# Patient Record
Sex: FEMALE | Race: WHITE | Employment: OTHER | URBAN - METROPOLITAN AREA
[De-identification: names, ages, dates, MRNs, and addresses within clinical notes are randomized per-mention and may not be internally consistent; named-entity substitution may affect disease eponyms.]

---

## 2022-04-22 ENCOUNTER — HOSPITAL ENCOUNTER (OUTPATIENT)
Dept: PHYSICAL THERAPY | Age: 62
Discharge: HOME | End: 2022-04-22
Payer: COMMERCIAL

## 2022-04-22 PROCEDURE — 97161 PT EVAL LOW COMPLEX 20 MIN: CPT

## 2022-04-22 NOTE — PROGRESS NOTES
PT DAILY TREATMENT NOTE/KNEE EVAL     Patient Name: Samira Herring  Date:2022  : 1960  [x]  Patient  Verified  Payor: BLUE CROSS / Plan: Tarik Ba 5747 PPO / Product Type: PPO /    In TXGH:[id number]  Out time:0940  Total Treatment Time (min): 44  Visit #: 1 of 10    Medicare/BCBS Only   Total Timed Codes (min):  0 1:1 Treatment Time:  44       Treatment Area: Pain in left knee [M25.562]      SUBJECTIVE  Pain Level (0-10 scale): 2  [x]constant []intermittent []improving []worsening []no change since onset     Any medication changes, allergies to medications, adverse drug reactions, diagnosis change, or new procedure performed?: [x] No    [] Yes (see summary sheet for update)  Subjective functional status/changes:       Subjective: Patient is a 65 y.o. female referred to PT with the above Dx. Patient seen today for c/o left knee pain with reports of a heavy feeling. Pain is worse with sitting during work and walking stairs. Pain with sitting with the need to stretch her knee out for comfort. Onset prior to 2021 after taking a part time job with a lot of stand, walking and lifting. Elevates left leg at night to help with swelling and increased heaviness. Swelling in (B) Knees. Always a sharp pain at the pes anserine region. About 3 weeks ago, she got out of her car and was not able to put weight into the left knee. Received a cortisone shot which helped with the pain. Patient presents to PT with an impaired gait, decreased balance, decreased strength, decreased flexibility, and decreased mobility of (B) Knee and hip. She has a (+) (B) Slump Test and (+) Piriformis Test  Patient s/s appear to be consistent w/ diagnosis. Patient demonstrates the potential to make functional gains within a reasonable time frame. Patient will benefit from skilled PT to address impairments and improve functional mobility, strength, gait and balance for an improved quality of life.   Fall Risk Assessment: Patient demonstrates no Fall Risk      Mechanism of Injury: Unsure or reason    Comorbidities: OA, Alcohol Use, HTN, Tobacco Use, Erythema nodosum \"is a type of skin inflammation that is located in a part of the fatty layer of skin. Erythema nodosum results in reddish, painful, tender lumps most commonly located in the front of the legs below the knees. The tender lumps, or nodules, of erythema nodosum range in size from a dime to a quarter. Nov 15, 2021\"    Prior Level of Function: Regular knee pain but not as bas as it currently is    FOTO: 48 / 59 in 12 visits    Goal: No Pain    Health Status: Fair    What type of work do you do?     Living Situation/Domestic Life: Lives at home alone  Mobility: (I)  Self Care (I)  Stairs in the home? Stairs at children's home    What type of daily activities/hobbies? Grand children play and activities    Limitations to Activity/Recreation/PLOF: Can't squat, walk up stairs, lift or carry         Barriers: [x]pain []financial []time []transportation []other    Motivation: High    FABQ Score: []low []elevate    Cognition: A & O x 3    Other:    Risk For Falls:   []No  []low []elevate           OBJECTIVE/EXAMINATION  Demonstrated Balance: Good with standing and ambulation       Demonstrated Mobility: WNL  Gait: Slow reciprocal with decreased (B) pelvic sway and decreased (I) Hip Flx  - \"C\" shaped curve at low Tx/Lx region  - Elev right crest with good (B) innominate mobility  - Left patella lateral tracking  - (+) (B) York Test  - Decreased left VMO initiation  - Fear of stair negotiation with walking down stairs  - (+) (B) Slump Test  - (+) (B) Piriformis test         AROM PROM Strength Pain? ?    Right Left Right Left Right Left    Hip Flx     4 3+    Knee Flx     5 5    Knee Ext     5- 5              34 min [x]Eval                  []Re-Eval         10  NC min Therapeutic Exercise:  [x] See flow sheet : Develop and instruct HEP   Rationale: increase ROM, increase strength, and improve coordination to improve the patients ability to Initiate HEP and improve daily function                                                                  With   [] TE   [] TA   [] neuro   [] other: Patient Education: [x] Review HEP    [] Progressed/Changed HEP based on:   [] positioning   [] body mechanics   [] transfers   [] heat/ice application    [] other:       Other Objective/Functional Measures:      Access Code: [email or web address]  URL: [email or web address]  Date: 04/22/2022  Prepared by: Candida Love    Exercises  Long Sitting Quad Set - 2 x daily - 7 x weekly - 10 reps - 5\" hold  Supine Knee Extension Strengthening - 2 x daily - 7 x weekly - 3 sets - 10 reps  Supine Straight Leg Raises - 2 x daily - 7 x weekly - 3 sets - 10 reps  Seated March - 2 x daily - 7 x weekly - 3 sets - 10 reps  Step Up - 2 x daily - 7 x weekly - 3 sets - 10 reps  Sit to Stand - 2 x daily - 7 x weekly - 3 sets - 10 reps  Mini Squat with Counter Support - 2 x daily - 7 x weekly - 3 sets - 10 reps  Standing Knee Flexion - 2 x daily - 7 x weekly - 3 sets - 10 reps  Seated Piriformis Stretch - 1 x daily - 7 x weekly - 3 sets - 10 reps  Sciatic Nerve Glide - 1 x daily - 7 x weekly - 3 sets - 20 reps    Pain Level (0-10 scale) post treatment: 2     ASSESSMENT/Changes in Function:        [x]  See Plan of Care  []  See progress note/recertification  []  See Discharge Summary         Progress towards goals / Updated goals:  Short Term Goals: To be accomplished in 5 treatments:  1. Pt will be compliant and independent with HEP in order to facilitate PT sessions and aid with self management   Eval Status:  Initiated   Current Status:  2. Pt to tolerate 30 min or more of TE and/or Interventions w/o increased s/s   Eval Status:  Initiated   Current Status:    Long Term Goals: To be accomplished in 10 treatments:  1.   Pt will report 50% improvement or better with left knee dysfunction to show a significant increase in ability to tolerate ADL   Eval Status:  Initiated   Current Status:  2. Pt will demonstrate stair negotiaton of 24 6\" steps with no HHA with a good pace and little to no added pain for carryover to walking stairs at her children's house while     Eval Status:  Initiated   Current Status:  3. Pt will demonstrate a negative Left Slump Test to show decreased neural tension for limited discomfort and deviations with activity     Eval Status:  Initiated   Current Status:  4. Pt will ambulate on TM for 1/2 mile with little discomfort for progress to increased community ambulation with little to no difficulty     Eval Status:  Extreme difficulty walking a mile   Current Status:  5.   Pt will improve FOTO score to 59 in 12 visits to show significant improvement in function for progress to usual community ambulation   Eval Status: FOTO = 48   Current Status:      PLAN  [x]  Upgrade activities as tolerated     [x]  Continue plan of care  []  Update interventions per flow sheet       []  Discharge due to:_  []  Other:_      Jumana Maier PT 4/22/2022  8:48 AM

## 2022-04-26 ENCOUNTER — HOSPITAL ENCOUNTER (OUTPATIENT)
Dept: PHYSICAL THERAPY | Age: 62
Discharge: HOME | End: 2022-04-26
Payer: COMMERCIAL

## 2022-04-26 PROCEDURE — 97112 NEUROMUSCULAR REEDUCATION: CPT

## 2022-04-26 PROCEDURE — 97110 THERAPEUTIC EXERCISES: CPT

## 2022-04-26 PROCEDURE — 97035 APP MDLTY 1+ULTRASOUND EA 15: CPT

## 2022-04-29 ENCOUNTER — APPOINTMENT (OUTPATIENT)
Dept: PHYSICAL THERAPY | Age: 62
End: 2022-04-29
Payer: COMMERCIAL

## 2022-05-04 ENCOUNTER — HOSPITAL ENCOUNTER (OUTPATIENT)
Dept: PHYSICAL THERAPY | Age: 62
Discharge: HOME | End: 2022-05-04
Payer: COMMERCIAL

## 2022-05-04 PROCEDURE — 97110 THERAPEUTIC EXERCISES: CPT

## 2022-05-04 PROCEDURE — 97112 NEUROMUSCULAR REEDUCATION: CPT

## 2022-05-04 PROCEDURE — 97035 APP MDLTY 1+ULTRASOUND EA 15: CPT

## 2022-05-04 NOTE — PROGRESS NOTES
PT DAILY TREATMENT NOTE     Patient Name: Nataliya Cardona  Date:2022  : 1960  [x]  Patient  Verified  Payor: BLUE ABISAI / Plan: Tarik Ba 5747 PPO / Product Type: PPO /    In time:247  Out time:334  Total Treatment Time (min): 52  Visit #: 3 of 10    Medicare/BCBS Only   Total Timed Codes (min):  47 1:1 Treatment Time:  47       Treatment Area: Pain in left knee [M25.562]    SUBJECTIVE  Pain Level (0-10 scale): 3-4/10  Any medication changes, allergies to medications, adverse drug reactions, diagnosis change, or new procedure performed?: [x] No    [] Yes (see summary sheet for update)  Subjective functional status/changes:   [] No changes reported  Reports slightly increased pain in the knee today     OBJECTIVE      Modality rationale: decrease inflammation, decrease pain and increase tissue extensibility to improve the patients ability to tolerate increased activity   Min Type Additional Details      []? Estim:  []? Unatt       []? IFC  []? Premod                        []?Other:  []?w/ice   []?w/heat  Position:  Location:      []? Estim: []? Att    []? TENS instruct  []? NMES                    []?Other:  []?w/US   []?w/ice   []?w/heat  Position:  Location:      []? Traction: []? Cervical       []? Lumbar                       []? Prone          []? Supine                       []?Intermittent   []? Continuous Lbs:  []? before manual  []? after manual    8 [x]? Ultrasound: [x]? Continuous   []? Pulsed                           []? 1MHz   [x]? 3MHz W/cm2: 1.3  Location: Left Knee      []? Iontophoresis with dexamethasone         Location: []? Take home patch   []? In clinic      []? Ice     []?  heat  []? Ice massage  []? Laser   []? Anodyne Position:  Location:      []? Laser with stim  []? Other:  Position:  Location:      []? Vasopneumatic Device    []? Right     []?   Left  Pre-treatment girth:  Post-treatment girth:  Measured at (location):  Pressure:       []? lo []? med []? hi Temperature: []? lo []? med []? hi    [x]? Skin assessment post-treatment:  [x]? intact []? redness- no adverse reaction    []? redness  adverse reaction:     29 min Therapeutic Exercise:  [x] See flow sheet :   Rationale: increase ROM and increase strength to improve the patients ability to perform ADLs     10 min Neuromuscular Re-education:  [x]  See flow sheet :   Rationale: increase ROM and increase strength  to improve the patients ability to perform ADLs      With   [x] TE   [] TA   [] neuro   [] other: Patient Education: [x] Review HEP    [] Progressed/Changed HEP based on:   [] positioning   [] body mechanics   [] transfers   [] heat/ice application    [] other:      Other Objective/Functional Measures:   - Increased 3 way glute to 2x10   - Initiated 2-way clam and SL hip abduction 1x10     Pain Level (0-10 scale) post treatment: 0/10    ASSESSMENT/Changes in Function: Patient actively participates in therapy and tolerates progressions well with no c/o increased pain       Patient will continue to benefit from skilled PT services to modify and progress therapeutic interventions, address functional mobility deficits, address ROM deficits, address strength deficits, analyze and address soft tissue restrictions and analyze and cue movement patterns to attain remaining goals.      []  See Plan of Care  []  See progress note/recertification  []  See Discharge Summary         Progress towards goals / Updated goals:  Short Term Goals: To be accomplished in 5 treatments:  1.  Pt will be compliant and independent with HEP in order to facilitate PT sessions and aid with self management             Eval Status:  Initiated             Current Status: Pt reports some compliance with HEP but not twice a day 4/26/22  2.  Pt to tolerate 30 min or more of TE and/or Interventions w/o increased s/s             Eval Status:  Initiated             Current Status: Progressing at 56 min of treatment with decreased s/s 4/26/22     Long Term Goals: To be accomplished in 10 treatments:  1.  Pt will report 50% improvement or better with left knee dysfunction to show a significant increase in ability to tolerate ADL             Eval Status:  Initiated             Current Status:  2.  Pt will demonstrate stair negotiaton of 24 6\" steps with no HHA with a good pace and little to no added pain for carryover to walking stairs at her children's house while               Eval Status:  Initiated             Current Status:  3.  Pt will demonstrate a negative Left Slump Test to show decreased neural tension for limited discomfort and deviations with activity               Eval Status:  Initiated             Current Status:  4.  Pt will ambulate on  for 1/2 mile with little discomfort for progress to increased community ambulation with little to no difficulty               Eval Status:  Extreme difficulty walking a mile             Current Status:  5.  Pt will improve FOTO score to 59 in 12 visits to show significant improvement in function for progress to usual community ambulation             Eval Status: FOTO = 48             Current Status:     PLAN  [x]  Upgrade activities as tolerated     [x]  Continue plan of care  []  Update interventions per flow sheet       []  Discharge due to:_  []  Other:_      Maurice Sanchez, PTA 5/4/2022  2:58 PM    Future Appointments   Date Time Provider Leena Lin   5/5/2022  9:30 AM Marquetta Nissen, PTA [facility]   5/6/2022  9:30 AM Otila Running, PT [facility]   5/10/2022  2:45 PM Otila Running, PT [facility]   5/13/2022  9:30 AM Patrice Vo, PTA [facility]   5/17/2022 11:00 AM Ángela Shannon, PT [facility]   5/20/2022 10:15 AM Patrice Vo, PTA [facility]   5/24/2022  9:30 AM Patrice Vo PTA [facility]   5/27/2022  9:30 AM Patrice Vo PTA [facility]

## 2022-05-05 ENCOUNTER — APPOINTMENT (OUTPATIENT)
Dept: PHYSICAL THERAPY | Age: 62
End: 2022-05-05
Payer: COMMERCIAL

## 2022-05-06 ENCOUNTER — HOSPITAL ENCOUNTER (OUTPATIENT)
Dept: PHYSICAL THERAPY | Age: 62
Discharge: HOME | End: 2022-05-06
Payer: COMMERCIAL

## 2022-05-06 PROCEDURE — 97112 NEUROMUSCULAR REEDUCATION: CPT

## 2022-05-06 PROCEDURE — 97110 THERAPEUTIC EXERCISES: CPT

## 2022-05-06 NOTE — PROGRESS NOTES
PT DAILY TREATMENT NOTE     Patient Name: Francisco Maya  Date:2022  : 1960  [x]  Patient  Verified  Payor: BLUE CROSS / Plan: [facility] PPO / Product Type: PPO /    In time:930 Out time:  Total Treatment Time (min): 45  Visit #: 4 of 10    Medicare/BCBS Only   Total Timed Codes (min):  45 1:1 Treatment Time:  45       Treatment Area: Pain in left knee [M25.562]    SUBJECTIVE  Pain Level (0-10 scale): 3  Any medication changes, allergies to medications, adverse drug reactions, diagnosis change, or new procedure performed?: [x] No    [] Yes (see summary sheet for update)  Subjective functional status/changes:   [] No changes reported  Continues to have increased pain in the morning at the back of the left knee    OBJECTIVE    Modality rationale: decrease inflammation, decrease pain and increase tissue extensibility to improve the patients ability to tolerate increased activity   Min Type Additional Details    [] Estim:  []Unatt       []IFC  []Premod                        []Other:  []w/ice   []w/heat  Position:  Location:    [] Estim: []Att    []TENS instruct  []NMES                    []Other:  []w/US   []w/ice   []w/heat  Position:  Location:    []  Traction: [] Cervical       []Lumbar                       [] Prone          []Supine                       []Intermittent   []Continuous Lbs:  [] before manual  [] after manual   8 [x]  Ultrasound: [x]Continuous   [] Pulsed                           []1MHz   [x]3MHz W/cm2: 1.3  Location: Left Knee front and back    []  Iontophoresis with dexamethasone         Location: [] Take home patch   [] In clinic    []  Ice     []  heat  []  Ice massage  []  Laser   []  Anodyne Position:  Location:    []  Laser with stim  []  Other:  Position:  Location:    []  Vasopneumatic Device    []  Right     []  Left  Pre-treatment girth:  Post-treatment girth:  Measured at (location):  Pressure:       [] lo [] med [] hi   Temperature: [] lo [] med [] hi   [x] Skin assessment post-treatment:  [x]intact []redness- no adverse reaction    []redness  adverse reaction:     25 min Therapeutic Exercise:  [x] See flow sheet :   Rationale: increase ROM, increase strength and improve coordination to improve the patients ability to tolerate increased activity    12 min Neuromuscular Re-education:  []  See flow sheet :   Rationale: increase strength and improve coordination  to improve the patients ability to help improve daily function             With   [x] TE   [] TA   [] neuro   [] other: Patient Education: [x] Review HEP    [] Progressed/Changed HEP based on:   [] positioning   [] body mechanics   [] transfers   [] heat/ice application    [] other:      Other Objective/Functional Measures:   - Minor VCs with exercise participation  - - Required VCs to increase Fwd lean with Sciatic NG  - C/O left knee pain with stabilization during standing hip strength training    Pain Level (0-10 scale) post treatment: 0    ASSESSMENT/Changes in Function:   - Pt demo good effort and participation with treatment program with good tolerance and decreased symptoms after treatment      Patient will continue to benefit from skilled PT services to modify and progress therapeutic interventions, address functional mobility deficits, address ROM deficits, address strength deficits, analyze and address soft tissue restrictions and analyze and cue movement patterns to attain remaining goals. []  See Plan of Care  []  See progress note/recertification  []  See Discharge Summary         Progress towards goals / Updated goals:  Short Term Goals: To be accomplished in 5 treatments:  1. Pt will be compliant and independent with HEP in order to facilitate PT sessions and aid with self management             Eval Status:  Initiated             Current Status: Pt reports some compliance with HEP but not twice a day 4/26/22  2.   Pt to tolerate 30 min or more of TE and/or Interventions w/o increased s/s             Eval Status:  Initiated             Current Status: Progressing at 56 min of treatment with decreased s/s 4/26/22     Long Term Goals: To be accomplished in 10 treatments:  1. Pt will report 50% improvement or better with left knee dysfunction to show a significant increase in ability to tolerate ADL             Eval Status:  Initiated             Current Status:  2. Pt will demonstrate stair negotiaton of 24 6\" steps with no HHA with a good pace and little to no added pain for carryover to walking stairs at her children's house while               Eval Status:  Initiated             Current Status: Progressing at 16 steps HHA with a reciprocal gait and good pace 5/6/22  3. Pt will demonstrate a negative Left Slump Test to show decreased neural tension for limited discomfort and deviations with activity               Eval Status:  Initiated             Current Status: Pt showing decreased neural tension with improved HS flexibility during stretches and Sciatic NG 5/6/22  4. Pt will ambulate on TM for 1/2 mile with little discomfort for progress to increased community ambulation with little to no difficulty               Eval Status:  Extreme difficulty walking a mile             Current Status: Progressing at 200' 5/6/22  5.   Pt will improve FOTO score to 59 in 12 visits to show significant improvement in function for progress to usual community ambulation             Eval Status: FOTO = 48             Current Status:      PLAN  [x]  Upgrade activities as tolerated     [x]  Continue plan of care  []  Update interventions per flow sheet       []  Discharge due to:_  []  Other:_      Bren Dorantes, PT 5/6/2022  8:28 AM    Future Appointments   Date Time Provider Leena Lin   5/6/2022  9:30 AM Kwame New PT [facility]   5/10/2022  2:45 PM Kwame New, PT [facility]   5/13/2022  9:30 AM Lula Ribeiro PTA [facility]   5/17/2022 11:00 AM Lalo Apple, PT [facility]   5/20/2022 10:15 AM Christina Shipman PTA [facility]   5/24/2022  9:30 AM Christina Shipman PTA [facility]   5/27/2022  9:30 AM Christina Shipman PTA [facility]

## 2022-05-10 ENCOUNTER — APPOINTMENT (OUTPATIENT)
Dept: PHYSICAL THERAPY | Age: 62
End: 2022-05-10
Payer: COMMERCIAL

## 2022-05-13 ENCOUNTER — APPOINTMENT (OUTPATIENT)
Dept: PHYSICAL THERAPY | Age: 62
End: 2022-05-13
Payer: COMMERCIAL

## 2022-05-17 ENCOUNTER — APPOINTMENT (OUTPATIENT)
Dept: PHYSICAL THERAPY | Age: 62
End: 2022-05-17
Payer: COMMERCIAL

## 2022-05-20 ENCOUNTER — APPOINTMENT (OUTPATIENT)
Dept: PHYSICAL THERAPY | Age: 62
End: 2022-05-20
Payer: COMMERCIAL

## 2022-05-24 ENCOUNTER — APPOINTMENT (OUTPATIENT)
Dept: PHYSICAL THERAPY | Age: 62
End: 2022-05-24
Payer: COMMERCIAL

## 2022-05-27 ENCOUNTER — APPOINTMENT (OUTPATIENT)
Dept: PHYSICAL THERAPY | Age: 62
End: 2022-05-27
Payer: COMMERCIAL

## 2022-05-31 ENCOUNTER — HOSPITAL ENCOUNTER (OUTPATIENT)
Dept: PHYSICAL THERAPY | Age: 62
Discharge: HOME | End: 2022-05-31
Payer: COMMERCIAL

## 2022-05-31 PROCEDURE — 97035 APP MDLTY 1+ULTRASOUND EA 15: CPT

## 2022-05-31 PROCEDURE — 97140 MANUAL THERAPY 1/> REGIONS: CPT

## 2022-05-31 NOTE — PROGRESS NOTES
PT DAILY TREATMENT NOTE     Patient Name: Hina Chamberlain  Date:2022  : 1960  [x]  Patient  Verified  Payor: BLUE CROSS / Plan: [id number] PPO / Product Type: PPO /    In time:330  Out time:400  Total Treatment Time (min): 30  Visit #: 5 of 10    Medicare/BCBS Only   Total Timed Codes (min):  30 1:1 Treatment Time:  30       Treatment Area: Pain in left knee [M25.562]    SUBJECTIVE  Pain Level (0-10 scale): 2/10  Any medication changes, allergies to medications, adverse drug reactions, diagnosis change, or new procedure performed?: [x] No    [] Yes (see summary sheet for update)  Subjective functional status/changes:   [] No changes reported  Patient reports 2/10 pain with a feeling of \"heaviness\"     OBJECTIVE    Modality rationale: decrease inflammation, decrease pain and increase tissue extensibility to improve the patients ability to perform ADLs   Min Type Additional Details    [] Estim:  []Unatt       []IFC  []Premod                        []Other:  []w/ice   []w/heat  Position:  Location:    [] Estim: []Att    []TENS instruct  []NMES                    []Other:  []w/US   []w/ice   []w/heat  Position:  Location:    []  Traction: [] Cervical       []Lumbar                       [] Prone          []Supine                       []Intermittent   []Continuous Lbs:  [] before manual  [] after manual   12 [x]  Ultrasound: [x]Continuous   [] Pulsed                           []1MHz   [x]3MHz W/cm2:1.3  Location: right knee     []  Iontophoresis with dexamethasone         Location: [] Take home patch   [] In clinic    []  Ice     []  heat  []  Ice massage  []  Laser   []  Anodyne Position:  Location:    []  Laser with stim  []  Other:  Position:  Location:    []  Vasopneumatic Device    []  Right     []  Left  Pre-treatment girth:  Post-treatment girth:  Measured at (location):  Pressure:       [] lo [] med [] hi   Temperature: [] lo [] med [] hi   [x] Skin assessment post-treatment: [x]intact []redness- no adverse reaction    []redness  adverse reaction:       18 min Manual Therapy:  STM/DTM/effleurage left gastroc/knee    The manual therapy interventions were performed at a separate and distinct time from the therapeutic activities interventions. Rationale: decrease pain, increase ROM, increase tissue extensibility and decrease edema  to perform ADLs            With   [x] TE   [] TA   [] neuro   [] other: Patient Education: [x] Review HEP    [] Progressed/Changed HEP based on:   [] positioning   [] body mechanics   [] transfers   [] heat/ice application    [] other:      Other Objective/Functional Measures:   - Patient declined any TE     Pain Level (0-10 scale) post treatment: 2/10    ASSESSMENT/Changes in Function: Patient continuously declines any TE. States \"I'm only here for the ultrasound. \" patient declines to make any further appointments until Pierson returns. Patient will continue to benefit from skilled PT services to modify and progress therapeutic interventions, address functional mobility deficits, address ROM deficits, address strength deficits, analyze and address soft tissue restrictions and analyze and cue movement patterns to attain remaining goals.      []  See Plan of Care  []  See progress note/recertification  []  See Discharge Summary         Progress towards goals / Updated goals:  Short Term Goals: To be accomplished in 5 treatments:  1.  Pt will be compliant and independent with HEP in order to facilitate PT sessions and aid with self management             Eval Status:  Initiated             Current Status: Pt reports some compliance with HEP but not twice a day 4/26/22  2.  Pt to tolerate 30 min or more of TE and/or Interventions w/o increased s/s             Eval Status:  Initiated             Current Status: Progressing at 56 min of treatment with decreased s/s 4/26/22     Long Term Goals: To be accomplished in 10 treatments:  1.  Pt will report 50% improvement or better with left knee dysfunction to show a significant increase in ability to tolerate ADL             Eval Status:  Initiated             Current Status:  2.  Pt will demonstrate stair negotiaton of 24 6\" steps with no HHA with a good pace and little to no added pain for carryover to walking stairs at her children's house while               Eval Status:  Initiated             Current Status: Progressing at 16 steps HHA with a reciprocal gait and good pace 5/6/22  3.  Pt will demonstrate a negative Left Slump Test to show decreased neural tension for limited discomfort and deviations with activity               Eval Status:  Initiated             Current Status: Pt showing decreased neural tension with improved HS flexibility during stretches and Sciatic NG 5/6/22  4.  Pt will ambulate on TM for 1/2 mile with little discomfort for progress to increased community ambulation with little to no difficulty               Eval Status:  Extreme difficulty walking a mile             Current Status: Progressing at 200' 5/6/22  5.  Pt will improve FOTO score to 59 in 12 visits to show significant improvement in function for progress to usual community ambulation             Eval Status: FOTO = 48             Current Status:      PLAN  [x]  Upgrade activities as tolerated     [x]  Continue plan of care  []  Update interventions per flow sheet       []  Discharge due to:_  []  Other:_      William Mc, PTA 5/31/2022  4:01 PM    No future appointments.

## 2022-06-01 NOTE — PROGRESS NOTES
[facility] at [address] [facility]  [address]  Phone: [phone]      Fax:  [phone]    Progress Note  Therapy Services  Patient name: Suyapa Harden Start of Care: 2022   Referral source: Yuridia Hernandez : 1960               Medical Diagnosis: Pain in left knee [M25.562]  Payor: BLUE [place] / Plan: [facility] PPO / Product Type: PPO /  Onset Date:Prior to last Evaristo with continued exacerbations as of 3/30/22               Treatment Diagnosis: Left Knee Pain   Prior Hospitalization: see medical history Provider#: [id number]   Medications: Verified on Patient summary List      Comorbidities: OA, Alcohol Use, HTN, Tobacco Use, Erythema nodosum \"is a type of skin inflammation that is located in a part of the fatty layer of skin. Erythema nodosum results in reddish, painful, tender lumps most commonly located in the front of the legs below the knees. The tender lumps, or nodules, of erythema nodosum range in size from a dime to a quarter. Nov 15, 2021\"  Prior Level of Function: Regular knee pain but not as bas as it currently is                  Visits from Start of Care: 5   Missed Visits: 8    Established Goals:            [address] be accomplished in 5 treatments:  1.  Pt will be compliant and independent with HEP in order to facilitate PT sessions and aid with self management             Eval Status:  Initiated             Current Status: Pt reports some compliance with HEP but not twice a day 22  2.  Pt to tolerate 30 min or more of TE and/or Interventions w/o increased s/s             Eval Status:  Initiated             Current Status: met at 56 min of treatment with decreased s/s      Long Term Goals: To be accomplished in 10 treatments:  1.  Pt will report 50% improvement or better with left knee dysfunction to show a significant increase in ability to tolerate ADL             Eval Status:  Initiated             Current Status:  2.  Pt will demonstrate stair negotiaton of 24 6\" steps with no HHA with a good pace and little to no added pain for carryover to walking stairs at her children's house while               Eval Status:  Initiated             Current Status: Progressing at 16 steps HHA with a reciprocal gait and good pace   3.  Pt will demonstrate a negative Left Slump Test to show decreased neural tension for limited discomfort and deviations with activity               Eval Status:  Initiated             Current Status: Pt showing decreased neural tension with improved HS flexibility during stretches and Sciatic NG   4.  Pt will ambulate on TM for 1/2 mile with little discomfort for progress to increased community ambulation with little to no difficulty               Eval Status:  Extreme difficulty walking a mile             Current Status: Progressing at 200'   5.  Pt will improve FOTO score to 59 in 12 visits to show significant improvement in function for progress to usual community ambulation             Eval Status: FOTO = 48             Current Status: Not assessed     Key Functional Changes: Patient has shown little to no progress with this treatment program d/t non-compliance with treatment program and appointment cancellations. Pain as of last visit was 2/10. Patient declined to participate in any TE. Patient was educated on the purpose of TE with physical therapy. Patient continues to decline and requests ultrasound.      Updated Goals: to be achieved in 10 treatments:   Short Term Goals: To be accomplished in 5 treatments:  1.  Pt will be compliant and independent with HEP in order to facilitate PT sessions and aid with self management             Eval Status: Pt reports some compliance with HEP but not twice a day              Current Status:   Long Term Goals: To be accomplished in 10 treatments:  1.  Pt will report 50% improvement or better with left knee dysfunction to show a significant increase in ability to tolerate ADL             Eval Status:  Initiated             Current Status:  2.  Pt will demonstrate stair negotiaton of 24 6\" steps with no HHA with a good pace and little to no added pain for carryover to walking stairs at her children's house while               Eval Status: Progressing at 16 steps HHA with a reciprocal gait and good pace              Current Status:   3.  Pt will demonstrate a negative Left Slump Test to show decreased neural tension for limited discomfort and deviations with activity               Eval Status:  Pt showing decreased neural tension with improved HS flexibility during stretches and Sciatic NG              Current Status:   4.  Pt will ambulate on TM for 1/2 mile with little discomfort for progress to increased community ambulation with little to no difficulty               Eval Status: Progressing at 200             Current Status:    5.  Pt will improve FOTO score to 59 in 12 visits to show significant improvement in function for progress to usual community ambulation             Eval Status: FOTO = 48             Current Status:    ASSESSMENT/RECOMMENDATIONS:  [x]Continue therapy per initial plan/protocol at a frequency of  2 x per week for 10 visits   []Continue therapy with the following recommended changes:_____________________      _____________________________________________________________________  []Discontinue therapy progressing towards or have reached established goals  []Discontinue therapy due to lack of appreciable progress towards goals  []Discontinue therapy due to lack of attendance or compliance  []Await Physician's recommendations/decisions regarding therapy  []Other:________________________________________________________________    Thank you for this referral.   Delvis Etienne, PTA 6/1/2022 9:30 AM  RILEY Corrigan     NOTE TO PHYSICIAN:  PLEASE COMPLETE THE ORDERS BELOW AND   [facility] TO [facility]: [phone]  If you are unable to process this request in 24 hours please contact our office:   [phone]  []  I have read the above report and request that my patient continue as recommended. []  I have read the above report and request that my patient continue therapy with the following changes/special instructions:________________________________________  []I have read the above report and request that my patient be discharged from therapy.     [de-identified] Signature:____________Date:_________TIME:________     Baljinder Stewart*  ** Signature, Date and Time must be completed for valid certification **

## 2022-06-02 ENCOUNTER — APPOINTMENT (OUTPATIENT)
Dept: PHYSICAL THERAPY | Age: 62
End: 2022-06-02

## 2022-06-14 ENCOUNTER — HOSPITAL ENCOUNTER (OUTPATIENT)
Dept: PHYSICAL THERAPY | Age: 62
Discharge: HOME | End: 2022-06-14
Payer: COMMERCIAL

## 2022-06-14 PROCEDURE — 97110 THERAPEUTIC EXERCISES: CPT

## 2022-06-14 NOTE — PROGRESS NOTES
PT DAILY TREATMENT NOTE     Patient Name: Hina Chamberlain  Date:2022  : 1960  [x]  Patient  Verified  Payor: BLUE CROSS / Plan: [address] PPO / Product Type: PPO /    In time:2:00  Out time:2:50  Total Treatment Time (min): 50  Visit #: 1 of 10    Medicare/BCBS Only   Total Timed Codes (min):  50 1:1 Treatment Time:  50       Treatment Area: Pain in left knee [M25.562]    SUBJECTIVE  Pain Level (0-10 scale): 5  Any medication changes, allergies to medications, adverse drug reactions, diagnosis change, or new procedure performed?: [x] No    [] Yes (see summary sheet for update)  Subjective functional status/changes:   [] No changes reported  Pt reports that she was sick for the past 3 weeks and she has not been able to do anything.     OBJECTIVE    Modality rationale: decrease inflammation and increase tissue extensibility to improve the patients ability to improve walking tolerance   Min Type Additional Details    [] Estim:  []Unatt       []IFC  []Premod                        []Other:  []w/ice   []w/heat  Position:  Location:    [] Estim: []Att    []TENS instruct  []NMES                    []Other:  []w/US   []w/ice   []w/heat  Position:  Location:    []  Traction: [] Cervical       []Lumbar                       [] Prone          []Supine                       []Intermittent   []Continuous Lbs:  [] before manual  [] after manual    []  Ultrasound: []Continuous   [] Pulsed                           []1MHz   []3MHz W/cm2:  Location:    []  Iontophoresis with dexamethasone         Location: [] Take home patch   [] In clinic   10 []  Ice     [x]  Heat with exercise  []  Ice massage  []  Laser   []  Anodyne Position: Supine  Location: Left Knee    []  Laser with stim  []  Other:  Position:  Location:    []  Vasopneumatic Device    []  Right     []  Left  Pre-treatment girth:  Post-treatment girth:  Measured at (location):  Pressure:       [] lo [] med [] hi   Temperature: [] lo [] med [] hi   [x] Skin assessment post-treatment:  [x]intact []redness- no adverse reaction    []redness - adverse reaction:     50 min Therapeutic Exercise:  [x] See flow sheet :   Rationale: increase ROM, increase strength and improve coordination to improve the patients ability to improve activity tolerance          With   [x] TE   [] TA   [] neuro   [] other: Patient Education: [x] Review HEP    [] Progressed/Changed HEP based on:   [] positioning   [] body mechanics   [] transfers   [] heat/ice application    [] other:      Other Objective/Functional Measures:   - Decr Mobility Right Innominate in stork stance  - Left Trendelenburg  - FOTO = 53         Pain Level (0-10 scale) post treatment: 2    ASSESSMENT/Changes in Function:   - Good response to treatment with decreased pain at 2/10 after treatment  - Good response to moist heat with exercise, allowing improved mobility and increased ease with ambulation    Patient will continue to benefit from skilled PT services to modify and progress therapeutic interventions, address functional mobility deficits, address ROM deficits, address strength deficits, analyze and address soft tissue restrictions and analyze and cue movement patterns to attain remaining goals.      []  See Plan of Care  []  See progress note/recertification  []  See Discharge Summary         Progress towards goals / Updated goals:  Updated Goals: to be achieved in 10 treatments:             Short Term Goals: To be accomplished in 5 treatments:  1.  Pt will be compliant and independent with HEP in order to facilitate PT sessions and aid with self management             Eval Status: Pt reports some compliance with HEP but not twice a day              Current Status: Pt reports non compliance 6/14/22  Long Term Goals: To be accomplished in 10 treatments:  1.  Pt will report 50% improvement or better with left knee dysfunction to show a significant increase in ability to tolerate ADL             Eval Status:  Initiated             Current Status:  2.  Pt will demonstrate stair negotiaton of 24 6\" steps with no HHA with a good pace and little to no added pain for carryover to walking stairs at her children's house while               Eval Status: Progressing at 16 steps HHA with a reciprocal gait and good pace              Current Status:   3.  Pt will demonstrate a negative Left Slump Test to show decreased neural tension for limited discomfort and deviations with activity               Eval Status:  Pt showing decreased neural tension with improved HS flexibility during stretches and Sciatic NG              Current Status: Continued left slump test 6/14/22  4.  Pt will ambulate on TM for 1/2 mile with little discomfort for progress to increased community ambulation with little to no difficulty               Eval Status: Progressing at 200             Current Status:  Progressing at 2x200' 6/14/22  5.  Pt will improve FOTO score to 59 in 12 visits to show significant improvement in function for progress to usual community ambulation             Eval Status: FOTO = 48             Current Status: FOTO = 53 6/14/22     PLAN  [x]  Upgrade activities as tolerated     [x]  Continue plan of care  []  Update interventions per flow sheet       []  Discharge due to:_  []  Other:_      Tennille Zamora, PT 6/14/2022  2:07 PM    Future Appointments   Date Time Provider Leena Lin   6/15/2022 11:45 AM Kaitlin Coker, PT [place] [facility]

## 2022-06-15 ENCOUNTER — HOSPITAL ENCOUNTER (OUTPATIENT)
Dept: PHYSICAL THERAPY | Age: 62
Discharge: HOME | End: 2022-06-15
Payer: COMMERCIAL

## 2022-06-15 PROCEDURE — 97530 THERAPEUTIC ACTIVITIES: CPT

## 2022-06-15 PROCEDURE — 97110 THERAPEUTIC EXERCISES: CPT

## 2022-06-15 NOTE — PROGRESS NOTES
PT DAILY TREATMENT NOTE     Patient Name: Lyn Shepard  Date:6/15/2022  : 1960  [x]  Patient  Verified  Payor: BLUE CROSS / Plan: [facility] PPO / Product Type: PPO /    In time:1153  Out time:1235  Total Treatment Time (min): 42  Visit #: 2 of 10    Medicare/BCBS Only   Total Timed Codes (min):  42 1:1 Treatment Time:  42       Treatment Area: Pain in left knee [M25.562]    SUBJECTIVE  Pain Level (0-10 scale): 0  Any medication changes, allergies to medications, adverse drug reactions, diagnosis change, or new procedure performed?: [x] No    [] Yes (see summary sheet for update)  Subjective functional status/changes:   [] No changes reported  Feeling better, just waiting for the knee to hurt with each step.     OBJECTIVE    Modality rationale: decrease inflammation, decrease pain and increase tissue extensibility to improve the patients ability to improve daily function   Min Type Additional Details    [] Estim:  []Unatt       []IFC  []Premod                        []Other:  []w/ice   []w/heat  Position:  Location:    [] Estim: []Att    []TENS instruct  []NMES                    []Other:  []w/US   []w/ice   []w/heat  Position:  Location:    []  Traction: [] Cervical       []Lumbar                       [] Prone          []Supine                       []Intermittent   []Continuous Lbs:  [] before manual  [] after manual   8 [x]  Ultrasound: []Continuous   [] Pulsed                           []1MHz   [x]3MHz W/cm2: 1.3  Location: Left knee and Pes Anserine    []  Iontophoresis with dexamethasone         Location: [] Take home patch   [] In clinic    []  Ice     []  heat  []  Ice massage  []  Laser   []  Anodyne Position:  Location:    []  Laser with stim  []  Other:  Position:  Location:    []  Vasopneumatic Device    []  Right     []  Left  Pre-treatment girth:  Post-treatment girth:  Measured at (location):  Pressure:       [] lo [] med [] hi   Temperature: [] lo [] med [] hi   [x] Skin assessment post-treatment:  [x]intact []redness- no adverse reaction    []redness - adverse reaction:     24 min Therapeutic Exercise:  [x] See flow sheet :   Rationale: increase ROM, increase strength and improve coordination to improve the patients ability to perform usual activity    10 min Therapeutic Activity:  [x]  See flow sheet :   Rationale: increase strength and improve coordination  to improve the patients ability to improve function               With   [x] TE   [] TA   [] neuro   [] other: Patient Education: [x] Review HEP    [] Progressed/Changed HEP based on:   [] positioning   [] body mechanics   [] transfers   [] heat/ice application    [] other:      Other Objective/Functional Measures:   - Pt reports Pain at the pes anserine region with perfoming quad sets  - VCs for HS sets  - Minor left knee pain with performing sit - stand  - Add step up with 6\" step    Pain Level (0-10 scale) post treatment: 0    ASSESSMENT/Changes in Function:   - Good exercise participation and effort  - VCs to complete exercises with pt demo same      Patient will continue to benefit from skilled PT services to modify and progress therapeutic interventions, address functional mobility deficits, address ROM deficits, address strength deficits, analyze and address soft tissue restrictions and analyze and cue movement patterns to attain remaining goals.      []  See Plan of Care  []  See progress note/recertification  []  See Discharge Summary         Progress towards goals / Updated goals:  Updated Goals: to be achieved in 10 treatments:             Short Term Goals: To be accomplished in 5 treatments:  1.  Pt will be compliant and independent with HEP in order to facilitate PT sessions and aid with self management             Eval Status: Pt reports some compliance with HEP but not twice a day              Current Status: Pt reports non compliance 6/14/22  Long Term Goals: To be accomplished in 10 treatments:  1.  Pt will report 50% improvement or better with left knee dysfunction to show a significant increase in ability to tolerate ADL             Eval Status:  Initiated             Current Status:  2.  Pt will demonstrate stair negotiaton of 24 6\" steps with no HHA with a good pace and little to no added pain for carryover to walking stairs at her children's house while               Eval Status: Progressing at 16 steps HHA with a reciprocal gait and good pace              Current Status:   3.  Pt will demonstrate a negative Left Slump Test to show decreased neural tension for limited discomfort and deviations with activity               Eval Status:  Pt showing decreased neural tension with improved HS flexibility during stretches and Sciatic NG              Current Status: Continued left slump test 6/14/22  4.  Pt will ambulate on TM for 1/2 mile with little discomfort for progress to increased community ambulation with little to no difficulty               Eval Status: Progressing at 200             Current Status:  Progressing at 2x200' 6/14/22  5.  Pt will improve FOTO score to 59 in 12 visits to show significant improvement in function for progress to usual community ambulation             Eval Status: FOTO = 48             Current Status: FOTO = 53 6/14/22        PLAN  [x]  Upgrade activities as tolerated     [x]  Continue plan of care  []  Update interventions per flow sheet       []  Discharge due to:_  []  Other:_      Ochoa Cuevas, PT 6/15/2022  11:54 AM    No future appointments.

## 2022-06-24 ENCOUNTER — APPOINTMENT (OUTPATIENT)
Dept: PHYSICAL THERAPY | Age: 62
End: 2022-06-24
Payer: COMMERCIAL

## 2022-06-28 NOTE — PROGRESS NOTES
[facility] at [address]  [address]  Phone: [phone]      Fax:  [phone]    Discharge Summary    Patient name: Hina Chamberlain Start of Care: 2022   Referral source: Shirley Arreola : 1960               Medical Diagnosis: Pain in left knee [M25.562]  Payor: [employer] / Plan: [facility] PPO / Product Type: PPO /  Onset Date:Prior to last [place] with continued exacerbations as of 3/30/22               Treatment Diagnosis: Left Knee Pain   Prior Hospitalization: see medical history Provider#: [id number]   Medications: Verified on Patient summary List      Comorbidities: OA, Alcohol Use, HTN, Tobacco Use, Erythema nodosum \"is a type of skin inflammation that is located in a part of the fatty layer of skin. Erythema nodosum results in reddish, painful, tender lumps most commonly located in the front of the legs below the knees. The tender lumps, or nodules, of erythema nodosum range in size from a dime to a quarter. Nov 15, 2021\"  Prior Level of Function: Regular knee pain but not as bas as it currently is                      Visits from Start of Care: 7    Missed Visits: 7    Reporting Period : 22 to 06/15/22      Updated Goals: to be achieved in 10 treatments:             Short Term Goals: To be accomplished in 5 treatments:  1. Pt will be compliant and independent with HEP in order to facilitate PT sessions and aid with self management             Eval Status: Pt reports some compliance with HEP but not twice a day                Current Status: Not met, Pt reports non compliance   Long Term Goals: To be accomplished in 10 treatments:  1. Pt will report 50% improvement or better with left knee dysfunction to show a significant increase in ability to tolerate ADL             Eval Status:  Initiated             Current Status: Not met, unable to assess   2.   Pt will demonstrate stair negotiaton of 24 6\" steps with no HHA with a good pace and little to no added pain for carryover to walking stairs at her children's house while               Eval Status: Progressing at 16 steps HHA with a reciprocal gait and good pace              Current Status: Not met, unable to assess   3. Pt will demonstrate a negative Left Slump Test to show decreased neural tension for limited discomfort and deviations with activity               Eval Status:  Pt showing decreased neural tension with improved HS flexibility during stretches and Sciatic NG              Current Status: not met, Continued left slump test 6/14/22  4. Pt will ambulate on TM for 1/2 mile with little discomfort for progress to increased community ambulation with little to no difficulty               Eval Status: Progressing at 200             Current Status: not met,  Progressing at 2x200' 6/14/22  5. Pt will improve FOTO score to 59 in 12 visits to show significant improvement in function for progress to usual community ambulation             Eval Status: FOTO = 48             Current Status: not met, FOTO = 53          Assessment/ Summary of Care: Patient has shown good progress with this treatment program. Pain as of last visit was 0/10. Patient has shown decreased pain and increased strength and mobility. FOTO score is 53. All STG/LTGs achieved as identified above. During a phone call, patient states she has finished her visits, is doing better, and does not wish to continue.        RECOMMENDATIONS:  [x]Discontinue therapy: []Patient has reached or is progressing toward set goals      [x]Patient is non-compliant or has abdicated      []Due to lack of appreciable progress towards set goals    Arun Joseph, ANUP 6/28/2022 3:12 PM  Miguel Cueva, RILEY

## 2023-02-27 ENCOUNTER — HOSPITAL ENCOUNTER (OUTPATIENT)
Facility: HOSPITAL | Age: 63
Setting detail: RECURRING SERIES
Discharge: HOME | End: 2023-03-02
Payer: COMMERCIAL

## 2023-02-27 PROCEDURE — 97110 THERAPEUTIC EXERCISES: CPT

## 2023-02-27 PROCEDURE — 97140 MANUAL THERAPY 1/> REGIONS: CPT

## 2023-02-27 PROCEDURE — 97161 PT EVAL LOW COMPLEX 20 MIN: CPT

## 2023-02-27 NOTE — PROGRESS NOTES
PT DAILY TREATMENT NOTE/LUMBAR EVAL     Patient Name: Oscar Fabian    Date: 2023    : 1960  Insurance: Payor: Vinita Petersen / Plan: Lu Reece / Product Type: *No Product type* /      Patient  verified yes     Visit #   Current / Total 1 24   Time   In / Out 1118 1210   Pain   In / Out 5 < 5   Subjective Functional Status/Changes: See subjective below    Changes to:  Meds, Allergies, Med Hx, Sx Hx? If yes, update Summary List no         Treatment Area: Thoracic back pain [M54.6]  Other low back pain [M54.59]  Right sided sciatica [M54.31]  SUBJECTIVE    [x]constant []intermittent []improving []worsening []no change since onset     Any medication changes, allergies to medications, adverse drug reactions, diagnosis change, or new procedure performed?: [x] No    [] Yes (see summary sheet for update)  Subjective functional status/changes:       Subjective: Patient is a 65 y.o. female referred to PT with the above Dx. Patient seen today for c/o back pain with radicular s/s into the right LE. Pain at the left anterior ribs area after having dry heaving. Onset about 4 years ago due to overwork with as a , and years of cleaning house. Patient presents to PT with an impaired gait, decreased balance, decreased strength, decreased flexibility, and decreased mobility of the low back, Thoracic Sppine, Pelvis and Right LE. She has a (+) right Slump Test.  Patient s/s appear to be consistent w/ diagnosis. Patient demonstrates the potential to make functional gains within a reasonable time frame. Patient will benefit from skilled PT to address impairments and improve functional mobility, strength, gait and balance for an improved quality of life.   Fall Risk Assessment: Patient demonstrates no Fall Risk      Mechanism of Injury: about a year with constant over work    Comorbidities: OA, Alcohol Use, HTN, Tobacco Use  Prior Level of Function: Long term chronic back, hip and knee pain that has progressively gotten worse. Able to do her usual work and activities with the grand children with little difficulty    FOTO: 37 / 46 in 11 visits    Goal: Minimum pain    Health Status: Fair    What type of work do you do? , sitting as a      Living Situation/Domestic Life:   Mobility: (I)  Self Care (I)  Stairs in the home? None    What type of daily activities/hobbies? Work, TV, Grandchildren    Limitations to Activity/Recreation/PLOF: Limit activities due to the pain,  limited family activity          Barriers: [x]pain []financial []time []transportation []other    Motivation: High    FABQ Score: []low []elevate    Cognition: A & O x 3    Other:    Risk For Falls:   []No  []low []elevate           OBJECTIVE/EXAMINATION  Demonstrated Balance: Good              Demonstrated Functional Mobility: Good      Gait: Decr Left Pelvic sway, (B) trendelenburg  - elev right crest  - Left Lx Lateral Shift  - No TTP at the right Tx region  - (+) (B) Trendelenburg  - Decr mobility (B) Innominate in stork stance  - (+) Right Slump Test  - (B) trunk rot 50%  - Elev Right Crest  - Elev Right Sacral base  - Tight (B) Trunk Rot  - Displace rib posteriorly left side at ~ T8-9 with TTP  - Decr (B) Multifidus initiation  - TTP Right Glute medius           AROM PROM Strength Pain? ?    Right Left Right Left Right Left    Hip Flx     4- 4-    Hip Ext     4- 4-    Hib ABD     4- 4    Hip ADD          Hip IR     3+ 3+    Hip ER     4+ 4+         Reflexes: [x] Not Tested   Left Right   Biceps (C5)     Brachioradiais (C6)     Triceps (C7)     Knee Jerk (L4)     Ankle Jerk (SI)             20 min [x]Eval     - untimed          Therapeutic Procedures:   Tx Min Billable or 1:1 Min (if diff from Tx Min) Procedure, Rationale, Specifics   20 20 34844 Therapeutic Exercise (timed):  increase ROM, strength, coordination, balance, and proprioception to improve patient's ability to progress to PLOF and address remaining functional goals. (see flow sheet as applicable)     Details if applicable:     12 12 83831 Manual Therapy (timed):  decrease pain, increase ROM, increase tissue extensibility, and decrease trigger points to improve patient's ability to progress to PLOF and address remaining functional goals. The manual therapy interventions were performed at a separate and distinct time from the therapeutic activities interventions . (see flow sheet as applicable)     Details if applicable:  Inf glide right sacral base, (B) Innominate p/a mobs, (B) LE LAD, (B) L/S and T/S Rot mobs           Details if applicable:            Details if applicable:            Details if applicable: Total  32 Total  32 Reminder: MC/BC bill using total billable min of TIMED therapeutic procedures (example: do not include dry needle or estim unattended, both untimed codes, in totals to left)     [x]  Patient Education billed concurrently with other procedures     Other Objective/Functional Measures:    Access Code: [email or web address]  URL: [email or web address]  Date: 02/27/2023  Prepared by: Earl Vickers    Exercises  Standing Marching - 2 x daily - 7 x weekly - 3 sets - 10 reps  Toe Touches - 2 x daily - 7 x weekly - 3 sets - 10 reps  Seated Flexion Stretch - 2 x daily - 7 x weekly - 3 sets - 10 reps  Seated Sidebending - 2 x daily - 7 x weekly - 3 sets - 10 reps  Seated Trunk Rotation - 2 x daily - 7 x weekly - 3 sets - 10 reps  Seated Piriformis Stretch - 2 x daily - 7 x weekly - 3 sets - 10 reps - 30\" hold  Seated Figure 4 Piriformis Stretch - 1 x daily - 7 x weekly - 3 sets - 10 reps  Sciatic Nerve Glide - 2 x daily - 7 x weekly - 2 sets - 20 reps - hold      ASSESSMENT/Changes in Function:    Patient tolerated this evaluation well without increased s/s during the initial assessment. Good response to treatment with improved pelvic alignment and mobility after treatment with increased ease of motion.  Patient was provided with a HEP and demonstrated understanding for it's performance. Pain as of initial visit was 5/10 at the [facility] Thoracic region. Patient will continue to benefit from skilled PT services to modify and progress therapeutic interventions, analyze and address functional mobility deficits, analyze and address ROM deficits, analyze and address strength deficits, analyze and address soft tissue restrictions, and analyze and cue for proper movement patterns to address functional deficits and attain remaining goals.        [x]  See Plan of Care - for goals and assessment     PLAN  [x]  Upgrade activities as tolerated     [x]  Continue plan of care  []  Update interventions per flow sheet       []  Other:_      Jaspal Cline, PT 2/27/2023  11:18 AM  Justification for Eval Code Complexity:  Patient History : 4 Years Hx of Tx/Lx pain  Examination see exam    Clinical Presentation: Consistent with Dx  Clinical Decision Making : FOTO : 43 /100

## 2023-02-27 NOTE — PROGRESS NOTES
[facility] at [address] [facility]  [address]  Phone: [phone]      Fax:  [phone]     Plan of Care/ Statement of Necessity for Physical Therapy Services            Patient name: Adolph Villarreal Start of Care: 2023   Referral source: Gerri Zamora, * : 1960    Medical Diagnosis: Thoracic and LBP   Onset Date:Long term pain with recent exacerbations on or about 23    Treatment Diagnosis: Thoracic back pain [M54.6]  Other low back pain [M54.59]  Right sided sciatica [M54.31]                                                 Prior Hospitalization: see medical history Provider#: [id number]   Medications: Verified on Patient Summary List   Insurance: Payor: Caro Huerta / Plan: Erick Vaughan / Product Type: *No Product type* /       Comorbidities: OA, Alcohol Use, HTN, Tobacco Use  Prior Level of Function: Long term chronic back, hip and knee pain that has progressively gotten worse. Able to do her usual work and activities with the grand children with little difficulty       The Plan of Care and following information is based on the information from the initial evaluation. Assessment/ key information: Patient is a 65 y.o. female referred to PT with the above Dx. Patient seen today for c/o back pain with radicular s/s into the right LE. Pain at the left anterior ribs area after having dry heaving. Onset about 4 years ago due to overwork with as a , and years of cleaning house. Patient presents to PT with an impaired gait, decreased balance, decreased strength, decreased flexibility, and decreased mobility of the low back, Thoracic Sppine, Pelvis and Right LE. She has a (+) right Slump Test.  Patient s/s appear to be consistent w/ diagnosis. Patient demonstrates the potential to make functional gains within a reasonable time frame.   Patient will benefit from skilled PT to address impairments and improve functional mobility, strength, gait and balance for an improved quality of life.   Fall Risk Assessment: Patient demonstrates no Fall Risk       Evaluation Complexity HistoryLOW Complexity : Zero comorbidities / personal factors that will impact the outcome / POC ; Examination MEDIUM Complexity : 3 Standardized tests and measures addressin body structure, function, activity limitation and / or participation in recreation  ;Presentation LOW Complexity : Stable, uncomplicated  ;Clinical Decision Making MEDIUM Complexity : FOTO score of 26-74 FOTO score = an established functional score where 100 = no disability  Overall Complexity Rating: LOW   Problem List: pain affecting function, decrease ROM, decrease strength, edema affection function, impaired gait/balance, decrease ADL/functional abilities, decrease activity tolerance, decrease flexibility/joint mobility, decrease transfer abilities , and other FOTO = 43    Treatment Plan may include any combination of the followin Therapeutic Exercise, 11691 Neuromuscular Re-Education, 44683 Manual Therapy, 44695 Therapeutic Activity, 33931 Self Care/Home Management, 08313 Electrical Stim unattended, 98006 Electrical Stim attended, 43981 Gait Training, 46510 Ultrasound, [id number] Mechanical Traction, [id number] Needle Insertion w/o Injection (1 or 2 muscles), and 91720 Needle Insertion w/o Injection (3+ muscles)  Vasopnuematic compression justification:  Per bilateral girth measures taken and listed above the edema is considered significant and having an impact on the patient's   Patient / Family readiness to learn indicated by: asking questions, trying to perform skills, interest, return verbalization , and return demonstration   Persons(s) to be included in education: patient (P)  Barriers to Learning/Limitations: None  Measures taken if barriers to learning present: N/A  Patient Goal (s): Minimum pain  Patient Self Reported Health Status: fair  Rehabilitation Potential: good    Short Term Goals: To be accomplished in 3  treatments:  1.  Pt will be compliant and independent with HEP in order to facilitate PT sessions and aid with self management  Eval Status: Initiated  Current Status:  2.  Pt to tolerate 30 min or more of TE and/or Interventions w/o increased s/s  Eval Status: Initiated  Current Status:     Long Term Goals: To be accomplished in 6  treatments:  1.  Pt will report 50% improvement or better with Tx/Lx/pelvis dysfunction/involvement to show a significant increase in function and the ability to  tolerate ADL  Eval Status: Initiated  Current Status:  2.  Pt will ambulate 1/4 mile on TM with pian 2/10 for carryover to prolonged standing and walking with usual ADLs and work with little difficulty  Eval Status: Pain 5/10 or worse with walking  Current Status:  3.  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  Pt will demonstrate a (-) Right Slump Test to show decreased neural tension to aid with improved strength and stability, allowing increased tolerance with usual daily activities with little to no difficulty  Eval Status: (+) Right Slump Test  Current Status:  5.  Pt will improve FOTO score to 52 in 11 visits to show significant improvement in function for progress to (I) work tasks with little to no difficulty to return to PLOF with little to no difficulty  Eval Status: FOTO = 43  Current Status:   Frequency / Duration: Patient to be seen 2 times per week for 24 treatments    Patient/ Caregiver education and instruction: Diagnosis, prognosis, self care, activity modification, and exercises     [x]  Plan of care has been reviewed with DYLLAN Solitario, PT 2/27/2023 11:16  AM  _____________________________________________________________________  I certify that the above Therapy Services are being furnished while the patient is under my care. I agree with the treatment plan and certify that this therapy is necessary.     [de-identified] Signature:____________Date:_________TIME:________                                      Weld Mckeon, *    ** Signature, Date and Time must be completed for valid certification **    Please sign and return to     [facility] at [address] [facility] [place] [address] [address]  Phone: [phone]      Fax:  [phone]

## 2023-03-03 ENCOUNTER — HOSPITAL ENCOUNTER (OUTPATIENT)
Facility: HOSPITAL | Age: 63
Setting detail: RECURRING SERIES
Discharge: HOME | End: 2023-03-06
Payer: COMMERCIAL

## 2023-03-03 PROCEDURE — 97110 THERAPEUTIC EXERCISES: CPT

## 2023-03-03 PROCEDURE — 97112 NEUROMUSCULAR REEDUCATION: CPT

## 2023-03-03 PROCEDURE — 97140 MANUAL THERAPY 1/> REGIONS: CPT

## 2023-03-08 ENCOUNTER — APPOINTMENT (OUTPATIENT)
Facility: HOSPITAL | Age: 63
End: 2023-03-08
Payer: COMMERCIAL

## 2023-03-10 ENCOUNTER — HOSPITAL ENCOUNTER (OUTPATIENT)
Facility: HOSPITAL | Age: 63
Setting detail: RECURRING SERIES
Discharge: HOME | End: 2023-03-13
Payer: COMMERCIAL

## 2023-03-10 PROCEDURE — 97112 NEUROMUSCULAR REEDUCATION: CPT

## 2023-03-10 PROCEDURE — 97140 MANUAL THERAPY 1/> REGIONS: CPT

## 2023-03-10 PROCEDURE — 97110 THERAPEUTIC EXERCISES: CPT

## 2023-03-10 NOTE — PROGRESS NOTES
PHYSICAL / OCCUPATIONAL THERAPY - DAILY TREATMENT NOTE (updated )    Patient Name: Ines Herndon    Date: 3/10/2023    : 1960  Insurance: Payor: Uma Love / Plan: Marcie Yost / Product Type: *No Product type* /      Patient  verified Yes     Visit #   Current / Total 3 24   Time   In / Out 12:30 1:30   Pain   In / Out 2 0   Subjective Functional Status/Changes: Feeling a little better   Changes to:  Meds, Allergies, Med Hx, Sx Hx? If yes, update Summary List no       TREATMENT AREA =  Thoracic back pain [M54.6]  Other low back pain [M54.59]  Right sided sciatica [M54.31]    OBJECTIVE    Therapeutic Procedures: Tx Min Billable or 1:1 Min (if diff from Tx Min) Procedure, Rationale, Specifics   34 32 86782 Therapeutic Exercise (timed):  increase ROM, strength, coordination, balance, and proprioception to improve patient's ability to progress to PLOF and address remaining functional goals. (see flow sheet as applicable)     Details if applicable:       16 16 29730 Neuromuscular Re-Education (timed):  improve balance, coordination, kinesthetic sense, posture, core stability and proprioception to improve patient's ability to develop conscious control of individual muscles and awareness of position of extremities in order to progress to PLOF and address remaining functional goals. (see flow sheet as applicable)     Details if applicable:     10 10 67438 Manual Therapy (timed):  decrease pain, increase ROM, and increase tissue extensibility to improve patient's ability to progress to PLOF and address remaining functional goals. The manual therapy interventions were performed at a separate and distinct time from the therapeutic activities interventions .  (see flow sheet as applicable)     Details if applicable:  Left Rib Mobs and KT I-Strip for spacing at the left 8th Rib, anterior portion            Details if applicable:            Details if applicable:     60 62 MC BC Totals Reminder: bill using total billable min of TIMED therapeutic procedures (example: do not include dry needle or estim unattended, both untimed codes, in totals to left)  8-22 min = 1 unit; 23-37 min = 2 units; 38-52 min = 3 units; 53-67 min = 4 units; 68-82 min = 5 units   Total Total     TOTAL TREATMENT TIME:        60     [x]  Patient Education billed concurrently with other procedures   [x] Review HEP    [] Progressed/Changed HEP, detail:    [] Other detail:       Objective Information/Functional Measures/Assessment  - Assess left ribs with no post deviation at this time  - TTP with anterior left ribs ~T8  - - Treat with Rib Mobs  - Good response to treatment with decreased pain and discomfort after treatment    Patient will continue to benefit from skilled PT / OT services to modify and progress therapeutic interventions, analyze and address functional mobility deficits, analyze and address ROM deficits, analyze and address strength deficits, analyze and address soft tissue restrictions, and analyze and cue for proper movement patterns to address functional deficits and attain remaining goals. Progress toward goals / Updated goals:  []  See Progress Note/Recertification    Short Term Goals: To be accomplished in 3  treatments:  1. Pt will be compliant and independent with HEP in order to facilitate PT sessions and aid with self management  Eval Status: Initiated  Current Status: Not yet attempted 3/3/2023  2. Pt to tolerate 30 min or more of TE and/or Interventions w/o increased s/s  Eval Status: Initiated  Current Status:     Long Term Goals: To be accomplished in 6  treatments:  1. Pt will report 50% improvement or better with Tx/Lx/pelvis dysfunction/involvement to show a significant increase in function and the ability to  tolerate ADL  Eval Status: Initiated  Current Status:  2.   Pt will ambulate 1/4 mile on TM with pian 2/10 for carryover to prolonged standing and walking with usual ADLs and work with little difficulty  Eval Status: Pain 5/10 or worse with walking  Current Status:  3. Pt will demonstrate good pelvic alignment and mobility upon arrival to treatment sessions to show improved back and pelvic function and stability to have little difficulty with performing usual ADLs   Eval Status: Pelvic obliquity with weakness (B) Multifidus resulting in low back instability  Current Status: Continued pelvic obliquity upon arrival today 3/10/23  4. Pt will demonstrate a (-) Right Slump Test to show decreased neural tension to aid with improved strength and stability, allowing increased tolerance with usual daily activities with little to no difficulty  Eval Status: (+) Right Slump Test  Current Status: Continued slump test with decreased neural tension today 3/10/23  5.   Pt will improve FOTO score to 52 in 11 visits to show significant improvement in function for progress to (I) work tasks with little to no difficulty to return to PLOF with little to no difficulty  Eval Status: FOTO = 43  Current Status:     PLAN  Yes  Continue plan of care  [x]  Upgrade activities as tolerated  []  Discharge due to :  []  Other:    Kayleigh Thomas, PT    3/10/2023    12:49 PM    Future Appointments   Date Time Provider Kendall Bunch   3/15/2023  1:30 PM Karey Garza PTA [facility]   3/17/2023 10:00 AM Karey Garza PTA [facility]   3/20/2023  8:00 AM Kayleigh Thomas, PT [facility]   3/23/2023  9:00 AM Karey Garza PTA [facility]

## 2023-03-15 ENCOUNTER — APPOINTMENT (OUTPATIENT)
Facility: HOSPITAL | Age: 63
End: 2023-03-15
Payer: COMMERCIAL

## 2023-03-20 ENCOUNTER — HOSPITAL ENCOUNTER (OUTPATIENT)
Facility: HOSPITAL | Age: 63
Setting detail: RECURRING SERIES
Discharge: HOME | End: 2023-03-23
Payer: COMMERCIAL

## 2023-03-20 PROCEDURE — 97140 MANUAL THERAPY 1/> REGIONS: CPT

## 2023-03-20 PROCEDURE — 97110 THERAPEUTIC EXERCISES: CPT

## 2023-03-20 NOTE — PROGRESS NOTES
mobs  - Reports of pain decreased pain at the Tx region but reports pain at the right hip post treatment  - - Treat right hip pain with CP after treatment  - Good response to treatment with decreased pain to 0/10 after treatment        Patient will continue to benefit from skilled PT / OT services to modify and progress therapeutic interventions, analyze and address functional mobility deficits, analyze and address ROM deficits, analyze and address strength deficits, analyze and address soft tissue restrictions, and analyze and cue for proper movement patterns to address functional deficits and attain remaining goals. Progress toward goals / Updated goals:  []  See Progress Note/Recertification    Short Term Goals: To be accomplished in 3  treatments:  1. Pt will be compliant and independent with HEP in order to facilitate PT sessions and aid with self management  Eval Status: Initiated  Current Status: Non compliant with HEP 3/20/23  2. Pt to tolerate 30 min or more of TE and/or Interventions w/o increased s/s  Eval Status: Initiated  Current Status: Progressing at 42 min of treatment with decreased pain 3/23/23     Long Term Goals: To be accomplished in 6  treatments:  1. Pt will report 50% improvement or better with Tx/Lx/pelvis dysfunction/involvement to show a significant increase in function and the ability to  tolerate ADL  Eval Status: Initiated  Current Status:  2. Pt will ambulate 1/4 mile on TM with pian 2/10 for carryover to prolonged standing and walking with usual ADLs and work with little difficulty  Eval Status: Pain 5/10 or worse with walking  Current Status:  3.   Pt will demonstrate good pelvic alignment and mobility upon arrival to treatment sessions to show improved back and pelvic function and stability to have little difficulty with performing usual ADLs   Eval Status: Pelvic obliquity with weakness (B) Multifidus resulting in low back instability  Current Status: Continued pelvic

## 2023-03-22 NOTE — PROGRESS NOTES
PHYSICAL / OCCUPATIONAL THERAPY - DAILY TREATMENT NOTE (updated )    Patient Name: Marc Rivera    Date: 3/23/2023    : 1960  Insurance: Payor: Nick Cruz / Plan: Radha Nash / Product Type: *No Product type* /      Patient  verified Yes     Visit #   Current / Total 6 24   Time   In / Out  900 953   Pain   In / Out 3 3   Subjective Functional Status/Changes: \"There is never not pain\"    Changes to:  Meds, Allergies, Med Hx, Sx Hx? If yes, update Summary List no       TREATMENT AREA =  Thoracic back pain [M54.6]  Other low back pain [M54.59]  Right sided sciatica [M54.31]    OBJECTIVE    OBJECTIVE  Modalities Rationale:     decrease inflammation, decrease pain, and increase tissue extensibility to improve patient's ability to progress to PLOF and address remaining functional goals. min [] Estim Unattended, type/location:                                                            []  w/ice    []  w/heat     min [] Estim Attended, type/location:                                                           []  w/US     []  w/ice    []  w/heat    []  TENS insruct       min []  Mechanical Traction: type/lbs                    []  pro   []  sup   []  int   []  cont    []  before manual    []  after manual   8 min [x]  Ultrasound, settings/location: Continuous  1Mhz. 1.7 W/cm2  at the right flank/Tx paraspinal     min []  Iontophoresis w/ dexamethasone, location:                                                []  take home patch       []  in clinic   10 min  unbill [x]  Ice     []  Heat    location/position: Right Hio     min []  Paraffin,  details:       min []  Vasopneumatic Device, press/temp:       min []  Rondal Sell / Raymond Corporal:      If using vaso (only need to measure limb vaso being performed on)      pre-treatment girth :       post-treatment girth :       measured at (landmark location) :       min []  Other:     Skin assessment post-treatment (if applicable):    [x]  intact    []

## 2023-03-23 ENCOUNTER — HOSPITAL ENCOUNTER (OUTPATIENT)
Facility: HOSPITAL | Age: 63
Setting detail: RECURRING SERIES
Discharge: HOME | End: 2023-03-26
Payer: COMMERCIAL

## 2023-03-23 PROCEDURE — 97112 NEUROMUSCULAR REEDUCATION: CPT

## 2023-03-23 PROCEDURE — 97035 APP MDLTY 1+ULTRASOUND EA 15: CPT

## 2023-03-23 PROCEDURE — 97110 THERAPEUTIC EXERCISES: CPT

## 2023-03-23 PROCEDURE — 97530 THERAPEUTIC ACTIVITIES: CPT

## 2023-03-30 ENCOUNTER — HOSPITAL ENCOUNTER (OUTPATIENT)
Facility: HOSPITAL | Age: 63
Setting detail: RECURRING SERIES
End: 2023-03-30
Payer: COMMERCIAL

## 2023-03-30 NOTE — PROGRESS NOTES
ADLs and work with little difficulty  Eval Status: Pain 5/10 or worse with walking  Current Status:  3. Pt will demonstrate good pelvic alignment and mobility upon arrival to treatment sessions to show improved back and pelvic function and stability to have little difficulty with performing usual ADLs   Eval Status: Pelvic obliquity with weakness (B) Multifidus resulting in low back instability  Current Status: Continued pelvic obliquity upon arrival today 3/17/23  4. Pt will demonstrate a (-) Right Slump Test to show decreased neural tension to aid with improved strength and stability, allowing increased tolerance with usual daily activities with little to no difficulty  Eval Status: (+) Right Slump Test  Current Status: Continued slump test with decreased neural tension today 3/10/23  5.   Pt will improve FOTO score to 52 in 11 visits to show significant improvement in function for progress to (I) work tasks with little to no difficulty to return to PLOF with little to no difficulty  Eval Status: FOTO = 43  Current Status: 3/23/23 assess at next visit     PLAN  {YES/NO:76758}  Continue plan of care  []  Upgrade activities as tolerated  []  Discharge due to :  []  Other:    Ishmael Lorenzo, PT    3/30/2023    7:10 AM    Future Appointments   Date Time Provider Kendall Bunch   3/30/2023  9:00 AM Ishmael Lorenzo, ABHISHEK [facility]   4/3/2023  9:30 AM Dayanara Rubi [facility]   4/17/2023  9:30 AM Susan Haq PT [facility]   4/24/2023  9:30 AM DYLLAN Hernandez

## 2023-04-24 ENCOUNTER — HOSPITAL ENCOUNTER (OUTPATIENT)
Facility: HOSPITAL | Age: 63
Setting detail: RECURRING SERIES
Discharge: HOME | End: 2023-04-27

## 2024-07-01 NOTE — PROGRESS NOTES
LVM for patient to call back [phone] regarding appointment for [facility] referral.  FARRAH   PHYSICAL / OCCUPATIONAL THERAPY - DAILY TREATMENT NOTE (updated )    Patient Name: Cyn Leonard    Date: 3/3/2023    : 1960  Insurance: Payor: Philip Fuchs / Plan: Hanny Ayudarum / Product Type: *No Product type* /      Patient  verified Yes     Visit #   Current / Total 2 24   Time   In / Out 8:08 8:51   Pain   In / Out 5 5   Subjective Functional Status/Changes: Pt states she was in 10/10 pain after the last appt   Changes to:  Meds, Allergies, Med Hx, Sx Hx? If yes, update Summary List no       TREATMENT AREA =  Thoracic back pain [M54.6]  Other low back pain [M54.59]  Right sided sciatica [M54.31]    OBJECTIVE    Therapeutic Procedures: Tx Min Billable or 1:1 Min (if diff from Tx Min) Procedure, Rationale, Specifics   23  63473 Therapeutic Exercise (timed):  increase ROM, strength, coordination, balance, and proprioception to improve patient's ability to progress to PLOF and address remaining functional goals. (see flow sheet as applicable)     Details if applicable:       10  32631 Neuromuscular Re-Education (timed):  improve balance, coordination, kinesthetic sense, posture, core stability and proprioception to improve patient's ability to develop conscious control of individual muscles and awareness of position of extremities in order to progress to PLOF and address remaining functional goals. (see flow sheet as applicable)     Details if applicable:     10  67174 Manual Therapy (timed):  decrease pain, increase ROM, and increase tissue extensibility to improve patient's ability to progress to PLOF and address remaining functional goals. The manual therapy interventions were performed at a separate and distinct time from the therapeutic activities interventions . (see flow sheet as applicable)     Details if applicable:   In prone, T/s PA mobs, T/s UPAs, ribsprings, DTM to julia t/s paraspinals and DTM to right rhomb/mid trap area   37  [address] Reminder: bill using total billable min of TIMED therapeutic procedures (example: do not include dry needle or estim unattended, both untimed codes, in totals to left)  8-22 min = 1 unit; 23-37 min = 2 units; 38-52 min = 3 units; 53-67 min = 4 units; 68-82 min = 5 units   Total Total     [x]  Patient Education billed concurrently with other procedures   [x] Review HEP    [] Progressed/Changed HEP, detail:    [] Other detail:       Objective Information/Functional Measures/Assessment  First f/u after Eval    Initiated treatment program per flow sheet. Pt has not yet attempted HEP d/t busy schedule. Pt c/o right sided ant rib pain that persists throughout treatment. Back pain decreased following treatment. T/s hypomobility and TTP @ right mid trap/rhomboid area. Limited ROM with open book str. Patient will continue to benefit from skilled PT / OT services to modify and progress therapeutic interventions, analyze and address functional mobility deficits, analyze and address ROM deficits, analyze and address strength deficits, analyze and address soft tissue restrictions, analyze and cue for proper movement patterns, and analyze and modify for postural abnormalities to address functional deficits and attain remaining goals. Progress toward goals / Updated goals:  []  See Progress Note/Recertification    Short Term Goals: To be accomplished in 3  treatments:  1. Pt will be compliant and independent with HEP in order to facilitate PT sessions and aid with self management  Eval Status: Initiated  Current Status: Not yet attempted 3/3/2023  2. Pt to tolerate 30 min or more of TE and/or Interventions w/o increased s/s  Eval Status: Initiated  Current Status:     Long Term Goals: To be accomplished in 6  treatments:  1. Pt will report 50% improvement or better with Tx/Lx/pelvis dysfunction/involvement to show a significant increase in function and the ability to  tolerate ADL  Eval Status: Initiated  Current Status:  2.   Pt will ambulate 1/4 mile on TM with selvin 2/10 for carryover to prolonged standing and walking with usual ADLs and work with little difficulty  Eval Status: Pain 5/10 or worse with walking  Current Status:  3. 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 Pt will demonstrate a (-) Right Slump Test to show decreased neural tension to aid with improved strength and stability, allowing increased tolerance with usual daily activities with little to no difficulty  Eval Status: (+) Right Slump Test  Current Status:  5.   Pt will improve FOTO score to 52 in 11 visits to show significant improvement in function for progress to (I) work tasks with little to no difficulty to return to PLOF with little to no difficulty  Eval Status: FOTO = 43  Current Status:     PLAN  Yes  Continue plan of care  []  Upgrade activities as tolerated  []  Discharge due to :  []  Other:    Hermelindo Mccartney PTA    3/3/2023    7:34 AM    Future Appointments   Date Time Provider Kendall Bunch   3/3/2023  8:00 AM Casi Perez PTA [facility]   3/8/2023  9:00 AM Tasia Raya, ABHISHEK [facility]   3/10/2023 12:30 PM Max Posada PTA [facility]   3/15/2023  1:30 PM Max Posada PTA [facility]   3/17/2023 10:00 AM Max Posada PTA [facility]   3/20/2023  8:00 AM Tasia Raya PT [facility]   3/23/2023  9:00 AM Max Posada PTA [facility]